# Patient Record
Sex: MALE | Race: WHITE | NOT HISPANIC OR LATINO | Employment: OTHER | URBAN - METROPOLITAN AREA
[De-identification: names, ages, dates, MRNs, and addresses within clinical notes are randomized per-mention and may not be internally consistent; named-entity substitution may affect disease eponyms.]

---

## 2017-01-16 ENCOUNTER — TRANSFERRED RECORDS (OUTPATIENT)
Dept: HEALTH INFORMATION MANAGEMENT | Facility: CLINIC | Age: 62
End: 2017-01-16

## 2017-04-19 ENCOUNTER — TRANSFERRED RECORDS (OUTPATIENT)
Dept: HEALTH INFORMATION MANAGEMENT | Facility: CLINIC | Age: 62
End: 2017-04-19

## 2017-04-20 NOTE — TELEPHONE ENCOUNTER
1.  Date/reason for appt: 4/26/17 - Esophageal CA  2.  Referring provider: self-referred     3.  Call to patient (Yes / No - short description): Yes  4.  Previous care at / records requested from: [facility] - records received via fax  5.  Recent Imaging: received via push from [facility]

## 2017-04-25 NOTE — PROGRESS NOTES
THORACIC SURGERY - NEW PATIENT OFFICE VISIT      Dear Dr. Vaughan,     I saw Mr. Hoyt today in consultation for the evaluation and treatment of Left Diaphragm Hemiparalyis.     HPI  Mr. Ascencion Hoyt is a 62 year old year-old male with a paralyzed left hemidiaphragm status post CAB in 2009.  He was previously very active and had no limitations.  Subsequent to CAB he reports developing significant SOB making him entirely sedentary aside from his work as a .  He says this hasn't worsened recently however he would like     Previsit Tests   ECHO 3/28/17: normal LV function, moderate LVH, aortic MG 15mmHg and mild AI.  CT 11/24/15:     PMH  CAD s/p CAB 2009, PCI 2015, bioprosthetic AVR 2016  L hemidiaphragm paralysis  PAD s/p multiple stents  HTN, HLD  Mod to severe COPD    Says he's no longer on plavix    No past medical history on file.     ETOH no  TOB active 1ppd x40 years    Physical examination  BMI       From a personal perspective, he is here today alone.  He drives a taxi.     IMPRESSION No diagnosis found.   62 year old year-old male with a left hemidiaphragm paralysis.     I DID NOT SEE MR HOYT TODAY, HE WAS SEEN EXCLUSIVELY BY DR. CORCORAN. HE LEFT BEFORE I HAD THE OPPORTUNITY TO MEET HIM. WE WILL REACH OUT TO HIM TO SEE IF HE IS INTERESTED IN RESCHEDULING A VISIT.

## 2017-04-26 ENCOUNTER — OFFICE VISIT (OUTPATIENT)
Dept: SURGERY | Facility: CLINIC | Age: 62
End: 2017-04-26
Attending: THORACIC SURGERY (CARDIOTHORACIC VASCULAR SURGERY)
Payer: COMMERCIAL

## 2017-04-26 ENCOUNTER — PRE VISIT (OUTPATIENT)
Dept: SURGERY | Facility: CLINIC | Age: 62
End: 2017-04-26

## 2017-04-26 VITALS
HEART RATE: 71 BPM | SYSTOLIC BLOOD PRESSURE: 136 MMHG | OXYGEN SATURATION: 95 % | DIASTOLIC BLOOD PRESSURE: 88 MMHG | WEIGHT: 163.36 LBS | TEMPERATURE: 97.3 F | RESPIRATION RATE: 18 BRPM

## 2017-04-26 DIAGNOSIS — J98.6 DIAPHRAGM PARALYSIS: Primary | ICD-10-CM

## 2017-04-26 DIAGNOSIS — J98.6 PARALYSIS OF DIAPHRAGM: Primary | ICD-10-CM

## 2017-04-26 PROCEDURE — 99212 OFFICE O/P EST SF 10 MIN: CPT | Mod: ZF

## 2017-04-26 RX ORDER — ATORVASTATIN CALCIUM 40 MG/1
40 TABLET, FILM COATED ORAL
COMMUNITY
Start: 2015-10-02

## 2017-04-26 RX ORDER — NITROGLYCERIN 0.4 MG/1
0.4 TABLET SUBLINGUAL
COMMUNITY
Start: 2011-08-15

## 2017-04-26 RX ORDER — DILTIAZEM HYDROCHLORIDE 120 MG/1
120 TABLET, FILM COATED ORAL
COMMUNITY
Start: 2016-05-13

## 2017-04-26 RX ORDER — ALBUTEROL SULFATE 90 UG/1
2-4 AEROSOL, METERED RESPIRATORY (INHALATION)
COMMUNITY
Start: 2016-11-08

## 2017-04-26 ASSESSMENT — PAIN SCALES - GENERAL: PAINLEVEL: NO PAIN (0)

## 2017-04-26 NOTE — MR AVS SNAPSHOT
"              After Visit Summary   4/26/2017    Ascencion Wall    MRN: [id number]           Patient Information     Date Of Birth          1955        Visit Information        Provider Department      4/26/2017 2:30 PM Hermes Franco MD [facility]        Today's Diagnoses     Diaphragm paralysis    -  1       Follow-ups after your visit        Future tests that were ordered for you today     Open Future Orders        Priority Expected Expires Ordered    X-ray Chest 2 vws* Routine 4/26/2017 4/26/2018 4/26/2017            Who to contact     If you have questions or need follow up information about today's clinic visit or your schedule please contact [facility] directly at [phone].  Normal or non-critical lab and imaging results will be communicated to you by MyChart, letter or phone within 4 business days after the clinic has received the results. If you do not hear from us within 7 days, please contact the clinic through [facility] or phone. If you have a critical or abnormal lab result, we will notify you by phone as soon as possible.  Submit refill requests through [employer] or call your pharmacy and they will forward the refill request to us. Please allow 3 business days for your refill to be completed.          Additional Information About Your Visit        [facility] [employer] Information     [facility] lets you send messages to your doctor, view your test results, renew your prescriptions, schedule appointments and more. To sign up, go to [email or web address] . Click on \"Log in\" on the left side of the screen, which will take you to the Welcome page. Then click on \"Sign up Now\" on the right side of the page.     You will be asked to enter the access code listed below, as well as some personal information. Please follow the directions to create your username and password.     Your access code is: [email or web address]  Expires: 7/19/2017  6:31 AM     Your access code will "  in 90 days. If you need help or a new code, please call your [place] clinic or [phone].        Care EveryWhere ID     This is your Care EveryWhere ID. This could be used by other organizations to access your [place] medical records  [id number]        Your Vitals Were     Pulse Temperature Respirations Pulse Oximetry          71 97.3  F (36.3  C) (Oral) 18 95%         Blood Pressure from Last 3 Encounters:   17 136/88    Weight from Last 3 Encounters:   17 74.1 kg (163 lb 5.8 oz)              Today, you had the following     No orders found for display       Primary Care Provider    None Specified       No primary provider on file.        Thank you!     Thank you for choosing [facility]  for your care. Our goal is always to provide you with excellent care. Hearing back from our patients is one way we can continue to improve our services. Please take a few minutes to complete the written survey that you may receive in the mail after your visit with us. Thank you!             Your Updated Medication List - Protect others around you: Learn how to safely use, store and throw away your medicines at www.disposemymeds.org.          This list is accurate as of: 17 11:59 PM.  Always use your most recent med list.                   Brand Name Dispense Instructions for use    albuterol 108 (90 BASE) MCG/ACT Inhaler    PROAIR HFA/PROVENTIL HFA/VENTOLIN HFA     Inhale 2-4 puffs into the lungs       aspirin 81 MG tablet      Take 81 mg by mouth       atorvastatin 40 MG tablet    LIPITOR     Take 40 mg by mouth       diltiazem 120 MG tablet    CARDIZEM     Take 120 mg by mouth       nitroglycerin 0.4 MG sublingual tablet    NITROSTAT     Place 0.4 mg under the tongue Reported on 2017

## 2017-04-26 NOTE — NURSING NOTE
Oncology Rooming Note    April 26, 2017 2:38 PM   Ascencion Wall is a 51 year old male who presents for: Oncology Clinic Visit    Initial Vitals: /88 (BP Location: Left arm, Patient Position: Chair, Cuff Size: Adult Regular)  Pulse 71  Temp 97.3  F (36.3  C) (Oral)  Resp 18  Wt 74.1 kg (163 lb 5.8 oz)  SpO2 95% There is no height or weight on file to calculate BMI. There is no height or weight on file to calculate BSA.  No Pain (0) Comment: Data Unavailable   No LMP for male patient.  Allergies reviewed: Yes  Medications reviewed: Yes    Medications: Medication refills not needed today.  Pharmacy name entered into EPIC: Data Unavailable    Clinical concerns:no concerns      6  minutes for nursing intake (face to face time)     Yuli Fay MA

## 2017-04-27 ENCOUNTER — TELEPHONE (OUTPATIENT)
Dept: SURGERY | Facility: CLINIC | Age: 62
End: 2017-04-27

## 2017-04-27 NOTE — TELEPHONE ENCOUNTER
I spoke with Ascencion today.  He left his appt with Dr. Franco because he was frustrated waiting. I apologized.     I told Ascencion we were still looking for some information and possibly imaging that tells us more about his diaphragm paralysis.  He notes that he shortness of breath developed right after his bypass surgery in 2009.  His most recent CT chest in 2015 doesn't show an elevated diaphragm.  The only mention of diaphragm paralysis is in a clinic note.  There are no other studies available to us.    I've contacted Dr. Mccauley at [facility] to see if he has further information that may be helpful.  Once I speak with Dr. Mccauley I'll contact the patient with a follow up plan.

## 2017-04-28 ENCOUNTER — TELEPHONE (OUTPATIENT)
Dept: SURGERY | Facility: CLINIC | Age: 62
End: 2017-04-28

## 2017-04-28 NOTE — TELEPHONE ENCOUNTER
Left message for Ascencion to call me to discuss recommended next steps.  I spoke with Dr. Mccauley.  The patient did have a sniff test in January 2010 which shows an elevated left hemidiaphragm with is paralyzed.  Only the report is available, no images.    Recommendation is for him to have a CXR and another sniff test.  He'd like to have those at [facility].  I've asked Dr. Mccauley's office to coordinate.  He will call me once that testing has been completed.

## 2017-09-05 ENCOUNTER — TELEPHONE (OUTPATIENT)
Dept: ONCOLOGY | Facility: CLINIC | Age: 62
End: 2017-09-05

## 2017-09-05 NOTE — TELEPHONE ENCOUNTER
[facility] Telephone Triage Note    Assessment: Patient called in to triage reporting the following symptoms: had an appt with Dr. Franco on 4/26/17 but left before seeing Dr. Franco. He is calling today after he said he called last week and did not receive a callback from thoracic nurses. He is interested in pulmonary stents, he has researched this procedure online and wonders if it might help his COPD and emphysema.      Recommendations: Message sent to MENDOZA Perez and Ramona Antonio.  requested they call the patient.    Follow-Up: Patient's phone cut out before I could let him know about again reaching our to our advanced practice nurses.

## 2017-09-06 ENCOUNTER — TELEPHONE (OUTPATIENT)
Dept: SURGERY | Facility: CLINIC | Age: 62
End: 2017-09-06

## 2017-09-06 NOTE — TELEPHONE ENCOUNTER
Received a voicemail from Ascencion stating he has some questions about treatment options for his diaphragm paralysis and SOB. I called him back but there was no answer. Message left with call back information.

## 2017-09-08 ENCOUNTER — TELEPHONE (OUTPATIENT)
Dept: SURGERY | Facility: CLINIC | Age: 62
End: 2017-09-08

## 2017-09-08 NOTE — TELEPHONE ENCOUNTER
"Call from Ascencion inquiring about placement of stents to help with his COPD. We talked about how COPD is destruction of lung tissue and stents are typically used to maintain airway integrity versus being able to sit in the actual lung parenchyma. He has a lot of RAMIREZ and since his heart surgery he has \"a lot of mucus production.\" He is currently smoking. He is a  and is fairly sedentary. He complains of weight gain that he attributes to his cholesterol medication. He stopped his cholesterol medications because of this. He says his primary care provider is not aware that he has stopped this medication. He says he has a pulmonologist but is \"not happy with him.\"  I offered to set him up with a pulmonologist here and he declined. He would like to see someone in [place] who can work with him to manage his COPD symptoms. I gave him the number of some Lung Clinics in [place].    I again stressed the importance of seeing his primary care provider ASAP to address the fact he stopped his cholesterol medication.    Ascencion says he will call and make appointments.  "

## 2021-05-25 ENCOUNTER — RECORDS - HEALTHEAST (OUTPATIENT)
Dept: ADMINISTRATIVE | Facility: CLINIC | Age: 66
End: 2021-05-25

## 2021-05-27 ENCOUNTER — RECORDS - HEALTHEAST (OUTPATIENT)
Dept: ADMINISTRATIVE | Facility: CLINIC | Age: 66
End: 2021-05-27

## 2024-08-05 ENCOUNTER — TRANSCRIBE ORDERS (OUTPATIENT)
Dept: URGENT CARE | Facility: URGENT CARE | Age: 69
End: 2024-08-05
Payer: COMMERCIAL

## 2024-08-05 DIAGNOSIS — Z74.09 IMPAIRED MOBILITY: ICD-10-CM

## 2024-08-05 DIAGNOSIS — J44.9 STAGE 3 SEVERE COPD BY GOLD CLASSIFICATION (H): Primary | ICD-10-CM

## 2024-08-15 ENCOUNTER — THERAPY VISIT (OUTPATIENT)
Dept: OCCUPATIONAL THERAPY | Facility: CLINIC | Age: 69
End: 2024-08-15
Attending: FAMILY MEDICINE
Payer: COMMERCIAL

## 2024-08-15 DIAGNOSIS — Z78.9 IMPAIRED MOBILITY AND ADLS: Primary | ICD-10-CM

## 2024-08-15 DIAGNOSIS — Z74.09 IMPAIRED MOBILITY AND ADLS: Primary | ICD-10-CM

## 2024-08-15 PROCEDURE — 97542 WHEELCHAIR MNGMENT TRAINING: CPT | Mod: GO | Performed by: OCCUPATIONAL THERAPIST

## 2024-08-15 NOTE — PROGRESS NOTES
"                                                                             SEATING AND WHEELED MOBILITY ASSESSMENT    [facility]  Occupational Therapy   Date of service: August 15, 2024    Referring provider: Rosalba Miranda MD   Order Date 8/5/24  Onset Date: 8/5/24    Order Details: felicia stroud    Funding:[facility]    Vendor: [employer]    Height/Weight: 5'8\" / 163    Medical diagnosis:   1. Stented coronary artery    2. Primary hypertension ([facility]C)    3. PAD (peripheral artery disease) ([facility])    4. Dyslipidemia ([facility])    5. Slow transit constipation    6. Pancreatic insufficiency    7. Chronic respiratory failure with hypoxia ([facility])    8. Stage 3 severe COPD by GOLD classification ([facility])        Cardio-respiratory status:  O2, PRN o2     Patient concerns/goals: scooter    Living environment:  Apartment    Living environment barriers:  None      Current assistance/living environment:  Lives alone    Community Mobility:  Transportation: Car  Community Mobility Requirements: Medical Appointments, Shopping  Accessibility Requirements for Community Settings: dr 2 blocks away      Current mobility equipment:  none    Fall Risk Screen:   Has the patient fallen 2 or more times in the last year? No      Has the patient fallen and had an injury in the past year? No       Timed Up and Go Score: wnl with SOV    Is the patient a fall risk? No    ADL: ind but frequent breaks    IADL:    Medications:ind  Meals: pca aids   Home management:  pca  Driving:  ind with car    Services:   PCA:  3.5 pca/day 10 hr homemaking/wk    Evaluation     Pain assessment:  Says not but then reports neck and back    Cognition:  wnl    Vision: glasses    Hearing: wnl    Fatigue:  Reported Problems: very limited endurance due to severe COPD- rest after all mobility to catch breath or use oxygen.    Balance:  Unsupported Sitting Balance: WNL  Sitting Balance in Chair: WNL  Standing Balance: WNL    Ambulation:  Level of " [place]: Independent    Neuromuscular:    Coordination:  WFL    Sensation:  Sensory Deficits Reported: Feet have neuropathies    Head and Neck:  Head and Neck Position: WFL  Head Control: Adequate  Tone/Movement of Head and Neck: pain in neck with movement    Upper Extremities  UE ROM: WFL  UE Strength: UE Strength WFL  Dominance: Right    Lower Extremities:  LE ROM: WFL  LE Strength: UE Strength WFL     Impairments:  Fatigue  Breath Support     Treatment diagnosis:  Impaired mobility  Impaired activities of daily living     Recommendations/Plan of care:  Patient would benefit from interventions to enhance safety and independence.  Occupational therapy intervention for  wheelchair management.    Goals:   Target date:   Patient, family and/or caregiver will verbalize/demonstrate understanding of compensatory methods /equipment to enhance functional independence and safety.    Educational assessment/barriers to learning:  No barriers noted    Treatment provided this date:   Wheelchair management, 40 minutes   Determine need for power scooter and safe trials in clinic to manage breathing.      Litesprite  Power Operated Vehicle / Scooter -  This device is being requested for this patient with mobility impairments to allow him to be able to complete all of his community mobility in a safe fashion, without risk of injury from falling, and in a reasonable time frame. He demonstrated during that he was able to transfer to/from the requested scooter, operate the tiller steering system, able to maintain postural stability and position while operating the POV, and operate the on/off mechanism and the speed dial appropriately and safely. They are very willing and physically / cognitively able to use the recommended equipment to assist with community mobility. There is a mobility limitation that cannot be sufficiently and safely resolved by the use of an appropriately fitted cane or walker and they do not have sufficient  upper extremity function to self-propel an optimally-configured manual wheelchair long distances during a typical day due to limitations in strength, endurance, range of motion, and coordination. This equipment is reasonable and necessary with reference to accepted standards of medical practice and treatment of this patient's condition and is not being recommended as a convenience item. Without this recommended equipment, he is highly likely to sustain injuries from falls which those costs far exceed the cost of the requested equipment.    This therapist has no financial connection to the equipment being requested or vendor being used.      I have read and concur with the above recommendations.    Physician Printed Name __________________________________________    Physician Signature  _____________________________________________    Date of Signature ______________________________    Physician Phone  ______________________________      Response to treatment/recommendations: understanding    Goal attainment:  All goals met    Risks and benefits of evaluation/treatment have been explained.  Patient, family and/or caregiver are in agreement with Plan of Care.     Timed Code Treatment Minutes: 40  Total Treatment Time (sum of timed and untimed services): 40    Electronically signed by:  Gabrielle PROCTOR, ATP      Occupational Therapist, Assistive   [phone]      fax: [phone]      [email or web address], [place] [address]  August 15, 2024

## 2025-01-23 ENCOUNTER — HOSPITAL ENCOUNTER (OUTPATIENT)
Facility: HOSPITAL | Age: 70
End: 2025-01-23
Attending: INTERNAL MEDICINE | Admitting: INTERNAL MEDICINE
Payer: COMMERCIAL